# Patient Record
Sex: MALE | Race: WHITE | NOT HISPANIC OR LATINO | Employment: FULL TIME | URBAN - METROPOLITAN AREA
[De-identification: names, ages, dates, MRNs, and addresses within clinical notes are randomized per-mention and may not be internally consistent; named-entity substitution may affect disease eponyms.]

---

## 2024-05-08 ENCOUNTER — OFFICE VISIT (OUTPATIENT)
Dept: OPTOMETRY | Facility: CLINIC | Age: 49
End: 2024-05-08
Payer: COMMERCIAL

## 2024-05-08 DIAGNOSIS — H52.203 ASTIGMATISM OF BOTH EYES, UNSPECIFIED TYPE: Primary | ICD-10-CM

## 2024-05-08 DIAGNOSIS — H01.006 BLEPHARITIS OF BOTH EYES, UNSPECIFIED EYELID, UNSPECIFIED TYPE: ICD-10-CM

## 2024-05-08 DIAGNOSIS — H10.13 ALLERGIC CONJUNCTIVITIS, BILATERAL: ICD-10-CM

## 2024-05-08 DIAGNOSIS — H01.003 BLEPHARITIS OF BOTH EYES, UNSPECIFIED EYELID, UNSPECIFIED TYPE: ICD-10-CM

## 2024-05-08 DIAGNOSIS — H02.889 MEIBOMIAN GLAND DYSFUNCTION: ICD-10-CM

## 2024-05-08 DIAGNOSIS — H52.4 PRESBYOPIA: ICD-10-CM

## 2024-05-08 DIAGNOSIS — H52.12 MYOPIA OF LEFT EYE: ICD-10-CM

## 2024-05-08 PROCEDURE — 92004 COMPRE OPH EXAM NEW PT 1/>: CPT | Performed by: OPTOMETRIST

## 2024-05-08 RX ORDER — OLOPATADINE HYDROCHLORIDE 2 MG/ML
1 SOLUTION/ DROPS OPHTHALMIC DAILY
Qty: 2.5 ML | Refills: 11 | Status: SHIPPED | OUTPATIENT
Start: 2024-05-08

## 2024-05-08 ASSESSMENT — VISUAL ACUITY
OS_SC: J8
OD_PH_CC: 20/30
OD_PH_SC: 20/25
OD_SC: 20/40
CORRECTION_TYPE: GLASSES
OS_SC: 20/60
OD_CC: 20/50
OS_CC: 20/40
METHOD: SNELLEN - LINEAR
OS_PH_SC: 20/40
OS_PH_CC: 20/25
OD_SC: J10

## 2024-05-08 ASSESSMENT — EXTERNAL EXAM - LEFT EYE: OS_EXAM: NORMAL

## 2024-05-08 ASSESSMENT — CONF VISUAL FIELD
OD_NORMAL: 1
OD_SUPERIOR_TEMPORAL_RESTRICTION: 0
OS_SUPERIOR_NASAL_RESTRICTION: 0
OD_INFERIOR_TEMPORAL_RESTRICTION: 0
OD_INFERIOR_NASAL_RESTRICTION: 0
OD_SUPERIOR_NASAL_RESTRICTION: 0
METHOD: COUNTING FINGERS
OS_SUPERIOR_TEMPORAL_RESTRICTION: 0
OS_NORMAL: 1
OS_INFERIOR_TEMPORAL_RESTRICTION: 0
OS_INFERIOR_NASAL_RESTRICTION: 0

## 2024-05-08 ASSESSMENT — REFRACTION_MANIFEST
OD_CYLINDER: +1.00
OD_SPHERE: -1.00
OS_AXIS: 178
OS_CYLINDER: +1.25
OD_AXIS: 025
OS_CYLINDER: +1.50
OD_SPHERE: -1.25
OD_AXIS: 005
OD_CYLINDER: +1.25
OD_ADD: +1.75
OS_AXIS: 180
OS_SPHERE: -1.75
OS_SPHERE: -1.75
OS_ADD: +1.75

## 2024-05-08 ASSESSMENT — TONOMETRY
IOP_METHOD: APPLANATION
OS_IOP_MMHG: 18
OD_IOP_MMHG: 18

## 2024-05-08 ASSESSMENT — KERATOMETRY
OD_K1POWER_DIOPTERS: 44.12
OD_AXISANGLE2_DEGREES: 120
OS_K1POWER_DIOPTERS: 44.50
OD_K2POWER_DIOPTERS: 45.00
OS_K2POWER_DIOPTERS: 45.37
OD_AXISANGLE_DEGREES: 030
OS_AXISANGLE2_DEGREES: 094
OS_AXISANGLE_DEGREES: 004

## 2024-05-08 ASSESSMENT — REFRACTION_WEARINGRX
OS_CYLINDER: +1.50
OD_CYLINDER: +1.00
OD_AXIS: 007
OD_SPHERE: -1.25
OS_AXIS: 170
SPECS_TYPE: SVL
OS_SPHERE: -1.75

## 2024-05-08 ASSESSMENT — EXTERNAL EXAM - RIGHT EYE: OD_EXAM: NORMAL

## 2024-05-08 ASSESSMENT — CUP TO DISC RATIO
OD_RATIO: 0.2
OS_RATIO: 0.2

## 2024-05-08 NOTE — PROGRESS NOTES
Chief Complaint   Patient presents with    Annual Eye Exam         Last Eye Exam: 2-3 y [facility]   Dilated Previously: No, side effects of dilation explained today    What are you currently using to see?  Glasses - prescription sunglasses       Distance Vision Acuity: Noticed gradual change in both eyes    Near Vision Acuity: Satisfied with vision while reading  unaided    Eye Comfort: hx of styes  Do you use eye drops? : Yes: lumify for stye   Occupation or Hobbies: [employer]             Medical, surgical and family histories reviewed and updated 5/8/2024.       OBJECTIVE: See Ophthalmology exam    ASSESSMENT:    ICD-10-CM    1. Astigmatism of both eyes, unspecified type  H52.203       2. Myopia of left eye  H52.12       3. Presbyopia  H52.4       4. Meibomian gland dysfunction  H02.889       5. Blepharitis of both eyes, unspecified eyelid, unspecified type  H01.003     H01.006       6. Allergic conjunctivitis, bilateral  H10.13           PLAN:   Warm compresses/ lid cleansing at bedtime   Allergy drops pataday once daily can use during allergy season or safe to  use all year  Progressive addition lens   Meghna Kuo OD

## 2024-05-08 NOTE — LETTER
5/8/2024         RE: Td Jeffery  [address] [facility] [address]        Dear Colleague,    Thank you for referring your patient, Td Jeffery, to the [facility]. Please see a copy of my visit note below.    Chief Complaint   Patient presents with     Annual Eye Exam         Last Eye Exam: 2-3 y [facility]   Dilated Previously: No, side effects of dilation explained today    What are you currently using to see?  Glasses - prescription sunglasses       Distance Vision Acuity: Noticed gradual change in both eyes    Near Vision Acuity: Satisfied with vision while reading  unaided    Eye Comfort: hx of styes  Do you use eye drops? : Yes: lumify for stye   Occupation or Hobbies: [employer]             Medical, surgical and family histories reviewed and updated 5/8/2024.       OBJECTIVE: See Ophthalmology exam    ASSESSMENT:    ICD-10-CM    1. Astigmatism of both eyes, unspecified type  H52.203       2. Myopia of left eye  H52.12       3. Presbyopia  H52.4       4. Meibomian gland dysfunction  H02.889       5. Blepharitis of both eyes, unspecified eyelid, unspecified type  H01.003     H01.006       6. Allergic conjunctivitis, bilateral  H10.13           PLAN:   Warm compresses/ lid cleansing at bedtime   Allergy drops pataday once daily can use during allergy season or safe to  use all year  Progressive addition lens   Meghna Kuo OD     Again, thank you for allowing me to participate in the care of your patient.        Sincerely,        Meghna Kuo, OD

## 2024-05-08 NOTE — PATIENT INSTRUCTIONS
Astigmatism is a common type of refractive error. It is a condition in which the human eye does not focus light evenly onto the retina, the light-sensitive tissue at the back of the eye.  Astigmatism in the eye means that the cornea is oval like a football instead of spherical like a basketball. Most astigmatic corneas have two curves - a steeper curve and a flatter curve. This causes light to focus on more than one point in the eye, resulting in blurred vision at distance or near.      No line bifocal , or progressive addition lens/ multifocal  (same thing)       Meibomian gland dysfunction or Posterior Blepharitis, is characterized by inflammation along both the uppper and lower eyelid margins. A single row of these glands is present in each lid with openings along the lid margins.  It is often found in association with skin conditions such as rosacea and seborrheic dermatitis.    Symptoms include:  ?Red eyes  ?Gritty or burning sensation  ?Excessive tearing  ?Itchy eyelids  ?Red, swollen eyelids  ?Crusting or matting of eyelashes in the morning  ?Light sensitivity  ?Blurred vision    It is important to keep cosmetics from blocking these oil glands. If blocked, they do not   excrete oil into the tear film, which causes the tears to evaporate quickly.   This may result in watery eyes.  There is also an increase of bacterial growth when the tear film is unstable, leading to further ocular surface inflammation.    Treatment:  1. Warm compresses for 5-10 minutes twice daily     2.  Keep the eyelid margins clean by using a commercial eye scrub or mild baby shampoo on a washcloth 1-2x daily    3. Use preservative free artificial tears 4-8x daily     For warm compresses    Moisten a washcloth with hot water, or microwave for 10 seconds, being careful to not get the cloth too hot.   Then put the washcloth onto your eyelids for 5 minutes. It will cool quickly so a rice pack or eyemask that can be heated and laid on top of  the washcloth will help retain the heat.    Omega 3 fatty acid supplements taken once to twice daily and artificial tears such as Soothe xp, Refresh optive , Retaine and systane balance are also an additional treatment to control inflammation and help soothe your eyes.         Blepharitis is a chronic or long term inflammation of the eyelids and eyelashes. It affects all ages. Causes include poor eyelid hygiene, excess oil production, staph bacteria or an allergic reaction.    Blepharitis may appear as greasy flakes on the base of the eyelashes, crusting of eyelashes and mild redness of the eyelid margins.  Sometimes it may result in an acute infection of a gland in the eyelid called a stye and sometimes painless firm nodules can form in the eyelid that do not resolve on their own and must be surgically removed.    Treatment includes warm compresses and lid hygiene with an antimicrobial lid scrub and sometimes a prescription ointment is needed.      Hot compresses/ warm soaks  Warm compresses are very beneficial to the normal functioning of the eye.   They help loosen up the eyelid debris that has collected on the eyelash follicles.  Overabundance of bacterial microorganisms along the eyelashes and lid margins induce stress on the tear film and promote inflammation.  Regular lid hygiene helps diminish the bacterial population to prevent inflammation and infection.  Cleanse lids once daily with a lid cleansing product as directed such as Ocusoft or Sterilid which can be purchased at most pharmacies. Eyelid cleansers maintain clean and healthy eyelid margins. Ocusoft or sterilid are commercial products that are available as individual wrapped cleansing pads.  Diluted baby shampoo will work, but not as well and is a cheaper alternative.    Directions for warm soaks  There are few methods for hot compresses. Moisten a washcloth with hot water, or microwave for 10 seconds, being careful to not get the cloth too hot.    Then put the washcloth onto your eyelids for 5 minutes. It will cool quickly so a rice pack or eyemask that can be heated and laid on top of the washcloth will help retain the heat.      Regular lid hygiene with an eye lid cleanser will prevent styes   Warm soaks prior will loosen the crusts that adhere to your lashes     In order to treat a stye, use warm compresses 4x daily for 5-10 minutes until it drains.   It is also recommended you also use a lid cleanser daily with an antibacterial product such as Ocusoft, or Sterilid cleanser at bedtime, which will help decrease the bacterial radha on your eye lids that cause styes.      For itching   Using over the counter Zaditor or Alaway- 1 drop in both eyes 2 x day or Pataday once daily   along with cold compresses and frequent eye/ brow washing will help for itchy, watery eyes.   Using continuously for 2 weeks will have better efficacy    You may also use chilled artificial tears during the day two times daily